# Patient Record
Sex: MALE | Race: WHITE | NOT HISPANIC OR LATINO | Employment: OTHER | URBAN - METROPOLITAN AREA
[De-identification: names, ages, dates, MRNs, and addresses within clinical notes are randomized per-mention and may not be internally consistent; named-entity substitution may affect disease eponyms.]

---

## 2017-05-15 ENCOUNTER — OFFICE VISIT (OUTPATIENT)
Dept: CARDIOLOGY | Facility: CLINIC | Age: 82
End: 2017-05-15

## 2017-05-15 VITALS
HEART RATE: 71 BPM | SYSTOLIC BLOOD PRESSURE: 114 MMHG | WEIGHT: 209 LBS | OXYGEN SATURATION: 95 % | HEIGHT: 72 IN | BODY MASS INDEX: 28.31 KG/M2 | DIASTOLIC BLOOD PRESSURE: 72 MMHG

## 2017-05-15 DIAGNOSIS — I45.10 RBBB (RIGHT BUNDLE BRANCH BLOCK): ICD-10-CM

## 2017-05-15 DIAGNOSIS — I48.3 TYPICAL ATRIAL FLUTTER (HCC): Primary | ICD-10-CM

## 2017-05-15 PROCEDURE — 99214 OFFICE O/P EST MOD 30 MIN: CPT | Performed by: INTERNAL MEDICINE

## 2017-11-20 ENCOUNTER — OFFICE VISIT (OUTPATIENT)
Dept: CARDIOLOGY | Facility: CLINIC | Age: 82
End: 2017-11-20

## 2017-11-20 VITALS
OXYGEN SATURATION: 92 % | HEART RATE: 65 BPM | BODY MASS INDEX: 29.12 KG/M2 | SYSTOLIC BLOOD PRESSURE: 130 MMHG | WEIGHT: 215 LBS | HEIGHT: 72 IN | DIASTOLIC BLOOD PRESSURE: 80 MMHG

## 2017-11-20 DIAGNOSIS — Z98.890 STATUS POST CATHETER ABLATION OF ATRIAL FLUTTER: Primary | ICD-10-CM

## 2017-11-20 PROCEDURE — 99213 OFFICE O/P EST LOW 20 MIN: CPT | Performed by: INTERNAL MEDICINE

## 2017-12-07 NOTE — PROGRESS NOTES
Date of Office Visit: 2017  Encounter Provider: Shakir Rogers MD  Place of Service: [facility] CARDIOLOGY  Patient Name: Fawad Winter  :1935    Chief complaint: Follow-up for typical atrial flutter ablation    History of Present Illness:    Dear Dr. Salas:    I again had the pleasure of seeing your patient in cardiology office on 2017.  As you   well know, he is a very pleasant 82 year-old male with a past medical history significant for   atrial flutter status post ablation and right bundle branch block who presents for follow-up.    The patient formerly was followed by Dr. Keon Avendano before he left our practice.  He   established care with me on 5/15/2017.    The patient presented in 2015 for a left quadriceps tear repair, and was noted to be   in rapid atrial flutter.  He had not felt any palpitations, and was unaware that he was out of   rhythm.  He had been slightly more short of breath with exertion.  He underwent a   transesophageal echocardiogram on 2015 which showed an ejection fraction of   50-55%, and no significant valvular disease.  He ultimately underwent a cavotricuspid   isthmus ablation for typical atrial flutter on 2015 by Dr. Avendano.  He has never had   documented atrial fibrillation, although there was some concern that he might develop this,   and he was kept on Eliquis.    The patient presents today for follow-up.  Again, he is only taking the Eliquis once a day,   as he had significant nosebleeds and bruising with the twice a day dosing in the past.  He   still works out 3 days a week without any difficulty.  He has no complaints, and denied any   chest pain or shortness of breath.    Past Medical History:   Diagnosis Date   • Atrial flutter     Status post atrial flutter ablation on 9/23/15 by Dr. Avendano    • RBBB (right bundle branch block)    • Vestibular disequilibrium        Past Surgical History:  "  Procedure Laterality Date   • KNEE SURGERY Left        Current Outpatient Prescriptions on File Prior to Visit   Medication Sig Dispense Refill   • ALPRAZolam (XANAX) 0.5 MG tablet Take 0.5 mg by mouth 2 (two) times a day as needed for anxiety.     • apixaban (ELIQUIS) 5 MG tablet tablet Take 1 tablet by mouth 2 (Two) Times a Day. 180 tablet 0   • finasteride (PROSCAR) 5 MG tablet Take  by mouth daily.     • HYDROcodone-acetaminophen (NORCO) 5-325 MG per tablet Take  by mouth.     • MULTIPLE VITAMINS PO Take  by mouth.     • simvastatin (ZOCOR) 80 MG tablet Take 80 mg by mouth every night. TAKE A HALF TABLET DAILY     • tamsulosin (FLOMAX) 0.4 MG capsule 24 hr capsule Take  by mouth daily.       No current facility-administered medications on file prior to visit.      Allergies as of 11/20/2017 - [facility] as Reviewed 11/20/2017   Allergen Reaction Noted   • Shellfish-derived products  04/15/2016     Social History     Social History   • Marital status: Single     Spouse name: N/A   • Number of children: N/A   • Years of education: N/A     Occupational History   • Not on file.     Social History Main Topics   • Smoking status: Never Smoker   • Smokeless tobacco: Never Used   • Alcohol use Yes      Comment: 2-3 glasses of wine per day   • Drug use: No   • Sexual activity: Not on file     Other Topics Concern   • Not on file     Social History Narrative     No family history on file.    Review of Systems   All other systems reviewed and are negative.    Objective:     Vitals:    11/20/17 1100   BP: 130/80   Pulse: 65   SpO2: 92%   Weight: 97.5 kg (215 lb)   Height: 182.9 cm (72\")     Body mass index is 29.16 kg/(m^2).    Physical Exam   Constitutional: He is oriented to person, place, and time. He appears well-developed and well-nourished.   HENT:   Head: Normocephalic and atraumatic.   Eyes: Conjunctivae are normal.   Neck: Neck supple.   Cardiovascular: Normal rate and regular rhythm.  Exam reveals no gallop and no " friction rub.    No murmur heard.  Pulmonary/Chest: Effort normal and breath sounds normal.   Abdominal: Soft. There is no tenderness.   Musculoskeletal: He exhibits no edema.   Neurological: He is alert and oriented to person, place, and time.   Skin: Skin is warm.   Psychiatric: He has a normal mood and affect. His behavior is normal.     Lab Review:   Procedures    Cardiac Procedures:  1.  Transesophageal echo on 9/22/2015: The ejection fraction was 50-55%.  The left   atrium was mildly dilated.  There was no PFO, although there was evidence of   intrapulmonary shunting on the bubble study.  There was mild tricuspid regurgitation.  2.  Status post cavotricuspid isthmus ablation for typical atrial flutter by Dr. Avendano on   9/23/2015.    Assessment:       Diagnosis Plan   1. Status post catheter ablation of atrial flutter       Plan:       The patient remains in normal sinus rhythm at this point.  Again, he has not had any   documented recurrence of the atrial flutter since his ablation.  He has also had no   documented atrial fibrillation.  He was kept on Eliquis empirically by Dr. Avendano as he   was felt to have a high risk to develop atrial fibrillation in the future.  He only takes the   Eliquis once a day, as he had nosebleeds and significant bleeding with small cuts in   the past when taking it twice a day.  I feel that this is reasonable considering that he   has had no documented atrial fibrillation thus far.  I will plan on seeing him back in   the office within the next 8 months.    Atrial Fibrillation and Atrial Flutter  Assessment  • The patient has paroxysmal atrial flutter  • This is non-valvular in etiology  • The patient's CHADS2-VASc score is 2  • A [id number]-CRIu score of 2 or more is considered a high risk for a thromboembolic event  • Apixaban prescribed  • Atrial flutter ablation 9/23/15.  No recurrence since that time.     Plan  • Attempt to maintain sinus rhythm  • Continue apixaban for  antithrombotic therapy, bleeding issues discussed

## 2018-07-26 ENCOUNTER — OFFICE VISIT (OUTPATIENT)
Dept: CARDIOLOGY | Facility: CLINIC | Age: 83
End: 2018-07-26

## 2018-07-26 VITALS
HEART RATE: 51 BPM | WEIGHT: 209 LBS | DIASTOLIC BLOOD PRESSURE: 76 MMHG | SYSTOLIC BLOOD PRESSURE: 142 MMHG | HEIGHT: 72 IN | BODY MASS INDEX: 28.31 KG/M2

## 2018-07-26 DIAGNOSIS — Z01.818 PRE-OPERATIVE CLEARANCE: Primary | ICD-10-CM

## 2018-07-26 DIAGNOSIS — I48.3 TYPICAL ATRIAL FLUTTER (HCC): ICD-10-CM

## 2018-07-26 PROCEDURE — 93000 ELECTROCARDIOGRAM COMPLETE: CPT | Performed by: INTERNAL MEDICINE

## 2018-07-26 PROCEDURE — 99214 OFFICE O/P EST MOD 30 MIN: CPT | Performed by: INTERNAL MEDICINE

## 2018-07-28 NOTE — PROGRESS NOTES
Date of Office Visit: 2018  Encounter Provider: Shakir Rogers MD  Place of Service: [facility] CARDIOLOGY  Patient Name: Fawad Winter  :1935    Chief complaint: Follow-up for typical atrial flutter ablation.  Preop clearance.    History of Present Illness:    Dear Dr. Salas:     I again had the pleasure of seeing your patient in cardiology office on 2018.  As you   well know, he is a very pleasant 82 year-old male with a past medical history significant   for atrial flutter status post ablation and right bundle branch block who presents for   follow-up.  The patient formerly was followed by Dr. Keon Avendano before he left our   practice.  He established care with me on 5/15/2017.     The patient presented in 2015 for a left quadriceps tear repair, and was noted to  be in rapid atrial flutter.  He had not felt any palpitations, and was unaware that he was   out of rhythm.  He had been slightly more short of breath with exertion.  He underwent a   transesophageal echocardiogram on 2015 which showed an ejection fraction of   50-55%, and no significant valvular disease.  He ultimately underwent a cavotricuspid   isthmus ablation for typical atrial flutter on 2015 by Dr. Avendano.  He has never had   documented atrial fibrillation, although there was some concern that he might develop   this, and he was kept on Eliquis.     The patient presents today for follow-up.  Unfortunately, he recently was found to have   a left kidney mass which has been found to be renal cell carcinoma.  He is scheduled   for a mass resection versus nephrectomy by Dr. Lizarraga in [facility].  He is here   mainly for preoperative clearance.  He has not had any chest pain or shortness of   breath.  He is in sinus rhythm today with a right bundle branch block.  He still works   out 3 days a week without any issues.    Past Medical History:   Diagnosis Date   • Atrial  "flutter (CMS/[facility])     Status post atrial flutter ablation on 9/23/15 by Dr. Avendano    • RBBB (right bundle branch block)    • Vestibular disequilibrium        Past Surgical History:   Procedure Laterality Date   • KNEE SURGERY Left        Current Outpatient Prescriptions on File Prior to Visit   Medication Sig Dispense Refill   • ALPRAZolam (XANAX) 0.5 MG tablet Take 0.5 mg by mouth 2 (two) times a day as needed for anxiety.     • apixaban (ELIQUIS) 5 MG tablet tablet Take 1 tablet by mouth Every 12 (Twelve) Hours. 60 tablet 3   • finasteride (PROSCAR) 5 MG tablet Take  by mouth daily.     • MULTIPLE VITAMINS PO Take  by mouth.     • simvastatin (ZOCOR) 80 MG tablet Take 80 mg by mouth every night. TAKE A HALF TABLET DAILY     • tamsulosin (FLOMAX) 0.4 MG capsule 24 hr capsule Take  by mouth daily.       No current facility-administered medications on file prior to visit.      Allergies as of 07/26/2018 - Reviewed 07/26/2018   Allergen Reaction Noted   • Shellfish-derived products  04/15/2016     Social History     Social History   • Marital status: Single     Spouse name: N/A   • Number of children: N/A   • Years of education: N/A     Occupational History   • Not on file.     Social History Main Topics   • Smoking status: Never Smoker   • Smokeless tobacco: Never Used   • Alcohol use Yes      Comment: 2-3 glasses of wine per day   • Drug use: No   • Sexual activity: Defer     Other Topics Concern   • Not on file     Social History Narrative   • No narrative on file     History reviewed. No pertinent family history.    Review of Systems   All other systems reviewed and are negative.     Objective:     Vitals:    07/26/18 0940   BP: 142/76   BP Location: Left arm   Patient Position: Sitting   Pulse: 51   Weight: 94.8 kg (209 lb)   Height: 182.9 cm (72\")     Body mass index is 28.35 kg/m².    Physical Exam   Constitutional: He is oriented to person, place, and time. He appears well-developed and well-nourished. "   HENT:   Head: Normocephalic and atraumatic.   Eyes: Conjunctivae are normal.   Neck: Neck supple.   Cardiovascular: Normal rate and regular rhythm.  Exam reveals no gallop and no friction rub.    No murmur heard.  Pulmonary/Chest: Effort normal and breath sounds normal.   Abdominal: Soft. There is no tenderness.   Musculoskeletal: He exhibits no edema.   Neurological: He is alert and oriented to person, place, and time.   Skin: Skin is warm.   Psychiatric: He has a normal mood and affect. His behavior is normal.     Lab Review:     ECG 12 Lead  Date/Time: 7/26/2018 8:57 AM  Performed by: JAVIER MENDEZ  Authorized by: JAVIER MENDEZ   Comparison: compared with previous ECG from 4/19/2016  Similar to previous ECG  Rhythm: sinus bradycardia  Ectopy: atrial premature contractions  Rate: bradycardic  BPM: 51  Conduction: right bundle branch block  Clinical impression: abnormal ECG            Assessment:       Diagnosis Plan   1. Pre-operative clearance     2. Typical atrial flutter (CMS/HCC)       Plan:       Patient seems to be stable at this point.  Again, he has had no chest pain or shortness of   breath.  He continues to work up 3 days a week without any difficulty.  He has remained   on the Eliquis after his atrial flutter ablation empirically as it was felt that he would have   high risk to develop atrial fibrillation in the future.  I feel that he is clear for his scheduled   partial nephrectomy versus nephrectomy for renal cell carcinoma.  He is at fairly low risk   for this procedure.  He is okay to hold the Eliquis as long as needed prior to the procedure,   preferably 4 days.  He has not had documented atrial fibrillation, and does not require   Lovenox bridging.  For now, I will plan on seeing him back in the office within approximately   6 months.    Atrial Fibrillation and Atrial Flutter  Assessment  • The patient has paroxysmal atrial flutter  • Status post atrial flutter ablation on 9/23/15  (without recurrence)

## 2019-02-11 RX ORDER — APIXABAN 5 MG/1
TABLET, FILM COATED ORAL
Qty: 60 TABLET | Refills: 0 | Status: SHIPPED | OUTPATIENT
Start: 2019-02-11 | End: 2020-01-23

## 2019-05-06 ENCOUNTER — TELEPHONE (OUTPATIENT)
Dept: CARDIOLOGY | Facility: CLINIC | Age: 84
End: 2019-05-06

## 2019-05-06 RX ORDER — APIXABAN 5 MG/1
TABLET, FILM COATED ORAL
Qty: 60 TABLET | Refills: 3 | Status: SHIPPED | OUTPATIENT
Start: 2019-05-06 | End: 2019-11-21 | Stop reason: SDUPTHER

## 2019-05-06 NOTE — TELEPHONE ENCOUNTER
05/06/19   Received fax from [facility] to verify if pt should be taking Eliquis 5 mg once daily or as prescribed twice daily.   Patient stated to Pharmacy that he is to be taking it once daily.   Please advise   Thanks Aroldo couch

## 2019-05-06 NOTE — TELEPHONE ENCOUNTER
The Eliquis should be 5 mg bid.  With just once a day, he is not getting adequate protection from a stroke.  Thanks     KATIE

## 2019-05-06 NOTE — TELEPHONE ENCOUNTER
I called pt unable to reach left a message to let him know rx was sent to pharmacy.   I also left a message letting him know he needs to take Eliquis  5 mg BID and not once daily as he told pharmacy.   I let him know that if not taking twice daily he does not have adequate proctection from stroke.   I asked him to call with any questions   Aroldo CRISTINA

## 2020-01-23 RX ORDER — APIXABAN 5 MG/1
TABLET, FILM COATED ORAL
Qty: 60 TABLET | Refills: 6 | Status: SHIPPED | OUTPATIENT
Start: 2020-01-23 | End: 2020-12-22

## 2020-07-27 RX ORDER — APIXABAN 5 MG/1
TABLET, FILM COATED ORAL
Qty: 60 TABLET | Refills: 0 | Status: SHIPPED | OUTPATIENT
Start: 2020-07-27 | End: 2020-09-03

## 2020-09-03 RX ORDER — APIXABAN 5 MG/1
TABLET, FILM COATED ORAL
Qty: 60 TABLET | Refills: 0 | Status: SHIPPED | OUTPATIENT
Start: 2020-09-03 | End: 2020-11-18

## 2020-11-18 ENCOUNTER — OFFICE VISIT (OUTPATIENT)
Dept: CARDIOLOGY | Facility: CLINIC | Age: 85
End: 2020-11-18

## 2020-11-18 VITALS
HEIGHT: 72 IN | SYSTOLIC BLOOD PRESSURE: 140 MMHG | BODY MASS INDEX: 28.74 KG/M2 | HEART RATE: 66 BPM | WEIGHT: 212.2 LBS | DIASTOLIC BLOOD PRESSURE: 70 MMHG

## 2020-11-18 DIAGNOSIS — E78.5 HYPERLIPIDEMIA, UNSPECIFIED HYPERLIPIDEMIA TYPE: ICD-10-CM

## 2020-11-18 DIAGNOSIS — Z86.79 STATUS POST ABLATION OF ATRIAL FLUTTER: ICD-10-CM

## 2020-11-18 DIAGNOSIS — I48.3 TYPICAL ATRIAL FLUTTER (HCC): Primary | ICD-10-CM

## 2020-11-18 DIAGNOSIS — E11.9 TYPE 2 DIABETES MELLITUS WITHOUT COMPLICATION, WITHOUT LONG-TERM CURRENT USE OF INSULIN (HCC): ICD-10-CM

## 2020-11-18 DIAGNOSIS — Z98.890 STATUS POST ABLATION OF ATRIAL FLUTTER: ICD-10-CM

## 2020-11-18 DIAGNOSIS — I45.10 RIGHT BUNDLE BRANCH BLOCK: ICD-10-CM

## 2020-11-18 PROBLEM — G51.9 FACIAL NERVE DISORDER: Status: ACTIVE | Noted: 2020-11-18

## 2020-11-18 PROBLEM — H90.5 SENSORINEURAL HEARING LOSS: Status: ACTIVE | Noted: 2020-11-18

## 2020-11-18 PROBLEM — Z12.11 SCREEN FOR COLON CANCER: Status: ACTIVE | Noted: 2018-06-11

## 2020-11-18 PROBLEM — I48.92 ATRIAL FLUTTER (HCC): Status: ACTIVE | Noted: 2020-11-18

## 2020-11-18 PROBLEM — N40.0 BENIGN PROSTATIC HYPERPLASIA: Status: ACTIVE | Noted: 2020-11-18

## 2020-11-18 PROBLEM — I48.91 ATRIAL FIBRILLATION (HCC): Status: ACTIVE | Noted: 2020-10-12

## 2020-11-18 PROBLEM — N28.89 RENAL MASS: Status: ACTIVE | Noted: 2018-08-01

## 2020-11-18 PROBLEM — C19 COLORECTAL CANCER (HCC): Status: ACTIVE | Noted: 2018-09-18

## 2020-11-18 PROBLEM — H61.20 WAX IN EAR: Status: ACTIVE | Noted: 2020-11-18

## 2020-11-18 PROCEDURE — 99214 OFFICE O/P EST MOD 30 MIN: CPT | Performed by: NURSE PRACTITIONER

## 2020-11-18 PROCEDURE — 93000 ELECTROCARDIOGRAM COMPLETE: CPT | Performed by: NURSE PRACTITIONER

## 2020-11-18 RX ORDER — ACETAMINOPHEN,DIPHENHYDRAMINE HCL 500; 25 MG/1; MG/1
2 TABLET, FILM COATED ORAL NIGHTLY
COMMUNITY

## 2020-11-18 NOTE — PROGRESS NOTES
Date of Office Visit:2020  Encounter Provider: PEYTON Lynne  Primary Cardiologist: Dr. Rogers  Place of Service: [facility] CARDIOLOGY  Patient Name: Fawad Winter  :1935      Subjective:     Chief Complaint:  Overdue follow-up, history of atrial flutter with prior ablation.    History of Present Illness:  Fawad Winter is a pleasant 85 y.o. male who is new to me .  Outside records have been requested and reviewed by me if available.     This is a patient of Dr. Rogers with a history of atrial flutter status post prior CTI ablation for typical atrial flutter , right bundle branch block.    2015 patient had a left quadriceps tear repair and was noted to be in rapid atrial flutter.  He was really relatively asymptomatic with some slight shortness of breath with exertion more than normal.  He had a KARIME 2015 that showed EF of 50-55% without significant valvular disease.  He ultimately underwent cavotricuspid isthmus ablation for typical atrial flutter 2015 by Dr. Avendano.  He had never had any documented atrial fibrillation but was concerned he was high risk for developing so is maintained on Eliquis 5 mg twice daily.    He has not been in our office since 2018 when he last saw Dr. Rogers.  At that time he was diagnosed with renal cell carcinoma on the left and was scheduled for mass resection versus nephrectomy with urology.  He was cleared to proceed with his surgery.      Presenting today for overdue follow-up.  Patient reports over the last month or so he has been having to care for his elderly sister with dementia and has had some increasing anxiety with this.  He is noticed on a few separate occasions about once a week he will have what feels like palpitations with some chest pressure and mild lightheadedness no shortness of breath the last about 20 minutes.  It does help if he takes some deep breaths.  He has not had  any syncope or near syncope.  Outside of this he works out golfs and does not have these symptoms associated.  He has been taking his Eliquis only once a day.  He did this on his own because of easy bruising.  He denied any bleeding issues or falls.  He will go back to twice daily dosing.  He states that he typically gets a stress test once a year or so with his PCP.  He believes his last stress test was summer 2019.  Patient drinks about a half bottle of wine per night this is not something new or changed.  He is not had any in decreasing caffeine intake and does not smoke.  He denies a history of CAD or MI/stenting.    Past Medical History:   Diagnosis Date   • Atrial flutter (CMS/HCC)     Status post atrial flutter ablation on 9/23/15 by Dr. Avendano    • RBBB (right bundle branch block)    • Vestibular disequilibrium      Past Surgical History:   Procedure Laterality Date   • KNEE SURGERY Left      Outpatient Medications Prior to Visit   Medication Sig Dispense Refill   • ALPRAZolam (XANAX) 0.5 MG tablet Take 0.5 mg by mouth 2 (two) times a day as needed for anxiety.     • diphenhydrAMINE-acetaminophen (TYLENOL PM)  MG tablet per tablet Take 2 tablets by mouth Every Night.     • ELIQUIS 5 MG tablet tablet TAKE 1 TABLET BY MOUTH 2 (TWO) TIMES A DAY. 60 tablet 6   • finasteride (PROSCAR) 5 MG tablet Take  by mouth daily.     • MULTIPLE VITAMINS PO Take  by mouth.     • simvastatin (ZOCOR) 80 MG tablet Take 80 mg by mouth every night. TAKE A HALF TABLET DAILY     • tamsulosin (FLOMAX) 0.4 MG capsule 24 hr capsule Take  by mouth daily.     • ELIQUIS 5 MG tablet tablet TAKE 1 TABLET BY MOUTH EVERY 12 HOURS. *NEEDS TO CONTACT OFFICE FOR AN APPOINTMENT TO RECEIVE MORE REFILLS 60 tablet 0     No facility-administered medications prior to visit.        Allergies as of 11/18/2020 - Reviewed 11/18/2020   Allergen Reaction Noted   • Shellfish-derived products  04/15/2016     Social History     Socioeconomic History   •  "Marital status: Single     Spouse name: Not on file   • Number of children: Not on file   • Years of education: Not on file   • Highest education level: Not on file   Tobacco Use   • Smoking status: Never Smoker   • Smokeless tobacco: Never Used   Substance and Sexual Activity   • Alcohol use: Yes     Comment: 2-3 glasses of wine per day   • Drug use: No   • Sexual activity: Defer     Family History   Problem Relation Age of Onset   • Dementia Sister      Review of Systems   Constitution: Negative for chills, fever and malaise/fatigue.   HENT: Negative for ear pain, hearing loss, nosebleeds and sore throat.    Eyes: Negative for double vision, pain, vision loss in left eye and vision loss in right eye.   Cardiovascular: Positive for chest pain and palpitations. Negative for claudication, dyspnea on exertion, irregular heartbeat, leg swelling, near-syncope, orthopnea, paroxysmal nocturnal dyspnea and syncope.   Respiratory: Negative for cough, shortness of breath, snoring and wheezing.    Endocrine: Negative for cold intolerance and heat intolerance.   Hematologic/Lymphatic: Negative for bleeding problem. Bruises/bleeds easily.   Skin: Negative for color change, itching, rash and unusual hair distribution.   Musculoskeletal: Negative for joint pain and joint swelling.   Gastrointestinal: Negative for abdominal pain, diarrhea, hematochezia, melena, nausea and vomiting.   Genitourinary: Negative for decreased libido, frequency, hematuria, hesitancy and incomplete emptying.   Neurological: Positive for tremors. Negative for excessive daytime sleepiness, dizziness, headaches, light-headedness, loss of balance, numbness, paresthesias and seizures.   Psychiatric/Behavioral: Negative for depression. The patient is nervous/anxious.           Objective:     Vitals:    11/18/20 1413   BP: 140/70   BP Location: Left arm   Patient Position: Sitting   Pulse: 66   Weight: 96.3 kg (212 lb 3.2 oz)   Height: 182.9 cm (72\")     Body " mass index is 28.78 kg/m².    PHYSICAL EXAM:  Vitals signs and nursing note reviewed.   Constitutional:       General: Not in acute distress.     Appearance: Well-developed, overweight and not in distress. Not diaphoretic.   HENT:      Head: Normocephalic and atraumatic.   Neck:      Vascular: No carotid bruit or JVD.   Pulmonary:      Effort: Pulmonary effort is normal. No respiratory distress.      Breath sounds: Normal breath sounds.   Cardiovascular:      Normal rate. Occasional ectopic beats. Regular rhythm.      Murmurs: There is no murmur.   Pulses:     Radial: 2+ bilaterally.  Edema:     Pretibial: bilateral trace edema of the pretibial area.     Ankle: bilateral trace edema of the ankle.     Feet: bilateral trace edema of the feet.  Abdominal:      General: Bowel sounds are normal. There is no distension.      Palpations: Abdomen is soft.      Tenderness: There is no abdominal tenderness.   Musculoskeletal: Normal range of motion.   Skin:     General: Skin is warm and dry.      Findings: No erythema.   Neurological:      Mental Status: Alert and oriented to person, place, and time.      Motor: Tremor present.   Psychiatric:         Attention and Perception: Attention normal.         Mood and Affect: Mood normal.         Speech: Speech normal.         Behavior: Behavior normal.         Thought Content: Thought content normal.         Judgment: Judgment normal.           ECG 12 Lead    Date/Time: 11/18/2020 2:18 PM  Performed by: Renee Simpson APRN  Authorized by: Renee Simpson APRN   Comparison: compared with previous ECG from 7/26/2018  Comparison to previous ECG: Reviewed current and prior ECG with Dr. Rogers  Rhythm: sinus rhythm  Ectopy: atrial premature contractions  Rate: normal  BPM: 66  Conduction: right bundle branch block  Other findings: non-specific ST-T wave changes and T wave abnormality  Comments: Some new nonspecific T wave abnormalities, rhythm is irregular today with PACs and some  nonconducted PACs  Indication: History of atrial flutter with ablation, palpitations/chest pressure            Most recent lab review:  11/5/2020 lipid panel total cholesterol 138, triglycerides 120, HDL 44, VLDL 22, LDL 72, CMP normal except for glucose elevated 131, hemoglobin A1c borderline elevated 5.8% patient had CBC with elevated H&H 18.3/53.4 the rest was normal he had elevated's serum testosterone level and per Dr. Salas they would be adjusting his testosterone.  7/28/2021 TSH normal 1.90, magnesium normal 2.1  Assessment:       Diagnosis Plan   1. Typical atrial flutter (CMS/HCC)  Holter Monitor - 72 Hour Up To 21 Days   2. Status post ablation of atrial flutter  Holter Monitor - 72 Hour Up To 21 Days   3. Right bundle branch block  Holter Monitor - 72 Hour Up To 21 Days   4. Type 2 diabetes mellitus without complication, without long-term current use of insulin (CMS/[facility])  Holter Monitor - 72 Hour Up To 21 Days   5. Hyperlipidemia, unspecified hyperlipidemia type  Holter Monitor - 72 Hour Up To 21 Days       Plan:     1. History of atrial flutter: Prior CTI ablation September 2019 per Dr. Avendano.  Maintained on apixaban for high risk for recurrence atrial fibrillation.  He is now having some palpitations with associated chest pressure and lightheadedness.  We will proceed with Zio patch to see if symptoms correlate with atrial fibrillation.  Continue anticoagulation for now.  We will hold off on stress testing but will try to obtain records from his recent stress test since his symptoms are not exertional and he is able to exercise and lift weights without symptoms, though he does have some ST-T wave abnormalities that are new from 2018 today. I did review patient's ECG, symptoms and case with Dr. Rogers who is in agreement.  2. Right bundle branch block: Unchanged.  3.  Diabetes: Managed by PCP  4.  Hyperlipidemia: On statin managed by PCP  5.  Anxiety: Caring for her elderly sister with dementia.   He does have some help.  Encouraged him to discuss with PCP.    His blood pressure is acceptable today for someone 85 years old.  We reviewed risk factors for atrial fibrillation/flutter including alcohol intake, caffeine intake.  We discussed cutting his alcohol intake back.  Bleeding and safety precautions reviewed.  He is appropriate for Eliquis 5 mg twice daily based on his weight and renal function.  He was advised that he needs to be taking the dose twice a day not once a day for adequate stroke prevention and he is in agreement and will go back to BID dosing.  Further recommendations to be made after her Zio patch results.  Patient was advised to call the office if is not heard about his results within 1 week of completing.  He verbalized understanding.      Follow up with Dr. Rogers in 6 months, unless otherwise needed sooner.  I advised the patient to contact our office with any questions or concerns.       It has been a pleasure to participate in this patient's care. Please feel free to contact me with any questions or concerns.     PEYTON Lynne  11/18/2020             Your medication list          Accurate as of November 18, 2020  3:04 PM. If you have any questions, ask your nurse or doctor.            CHANGE how you take these medications      Instructions Last Dose Given Next Dose Due   Eliquis 5 MG tablet tablet  Generic drug: apixaban  What changed: Another medication with the same name was removed. Continue taking this medication, and follow the directions you see here.  Changed by: PEYTON Lynne      TAKE 1 TABLET BY MOUTH 2 (TWO) TIMES A DAY.          CONTINUE taking these medications      Instructions Last Dose Given Next Dose Due   ALPRAZolam 0.5 MG tablet  Commonly known as: XANAX      Take 0.5 mg by mouth 2 (two) times a day as needed for anxiety.       diphenhydrAMINE-acetaminophen  MG tablet per tablet  Commonly known as: TYLENOL PM      Take 2 tablets by mouth Every  Night.       finasteride 5 MG tablet  Commonly known as: PROSCAR      Take  by mouth daily.       multivitamin tablet tablet  Commonly known as: THERAGRAN      Take  by mouth.       simvastatin 80 MG tablet  Commonly known as: ZOCOR      Take 80 mg by mouth every night. TAKE A HALF TABLET DAILY       tamsulosin 0.4 MG capsule 24 hr capsule  Commonly known as: FLOMAX      Take  by mouth daily.              The above medication changes may not have been made by this provider.  Medication list was updated to reflect medications patient is currently taking including medication changes and discontinuations made by other healthcare providers or the patients themselves.     Dictated utilizing Dragon Dictation System.

## 2020-11-19 ENCOUNTER — TELEPHONE (OUTPATIENT)
Dept: CARDIOLOGY | Facility: CLINIC | Age: 85
End: 2020-11-19

## 2020-11-19 NOTE — TELEPHONE ENCOUNTER
Received stress test from [facility] 6/12/19.  No interpretation.  Briefly reviewed with Dr. Rogers.  No changes.

## 2020-12-09 ENCOUNTER — TELEPHONE (OUTPATIENT)
Dept: CARDIOLOGY | Facility: CLINIC | Age: 85
End: 2020-12-09

## 2020-12-09 NOTE — TELEPHONE ENCOUNTER
12/09/2020  Pt called for results of zio monitor.   I called him back he turned monitor in Thursday. I let him know it takes sometime for company to get results to us and then Dr 's read it then.   I let him know once read by  someone will be calling him with results.   Aroldo CRISTINA

## 2020-12-21 ENCOUNTER — TELEPHONE (OUTPATIENT)
Dept: CARDIOLOGY | Facility: CLINIC | Age: 85
End: 2020-12-21

## 2020-12-21 PROBLEM — I49.1 PREMATURE ATRIAL CONTRACTIONS: Status: ACTIVE | Noted: 2020-12-21

## 2020-12-21 PROBLEM — I47.1 PAROXYSMAL SVT (SUPRAVENTRICULAR TACHYCARDIA) (HCC): Status: ACTIVE | Noted: 2020-12-21

## 2020-12-21 NOTE — TELEPHONE ENCOUNTER
Called and spoke with the patient after reviewing his monitor results with Dr. Rogers. He has some paroxysmal SVT and frequent PACs, but he reports his symptoms are improved (resolved)   now that his stress levels have gone down and he is no longer having palpitations or chest pain or pressure.  We did discuss starting low-dose of a beta-blocker metoprolol succinate 12.5 mg daily but he declines at this time.  He measures his heart rate at home consistently with apple watch and has not had any significant tachycardia.  Gets up about to 110s with working out he is feeling good with that.He does not check his blood pressure at home but will get a cuff and I informed him let us know with consistent readings outside of 100 140/60-80 in case we were to need to him on a beta-blocker.  He is taking his Eliquis twice a day now.  For now we will not start any medications but I did talk about calling with recurrence of symptoms and minimizing caffeine and alcohol intake.  He does report mild snoring and is never been tested for sleep apnea and reports he will discuss this with his PCP Dr. Salas. he will call with any blood pressure issues or recurrence of symptoms otherwise Dr. Rogers will see him as scheduled 5/24/2021.

## 2021-03-29 RX ORDER — APIXABAN 5 MG/1
TABLET, FILM COATED ORAL
Qty: 60 TABLET | Refills: 5 | Status: SHIPPED | OUTPATIENT
Start: 2021-03-29 | End: 2021-11-10

## 2021-11-09 RX ORDER — APIXABAN 5 MG/1
TABLET, FILM COATED ORAL
Qty: 60 TABLET | Refills: 3 | OUTPATIENT
Start: 2021-11-09